# Patient Record
Sex: FEMALE | Race: WHITE | NOT HISPANIC OR LATINO | Employment: FULL TIME | URBAN - METROPOLITAN AREA
[De-identification: names, ages, dates, MRNs, and addresses within clinical notes are randomized per-mention and may not be internally consistent; named-entity substitution may affect disease eponyms.]

---

## 2017-02-07 ENCOUNTER — COMMUNICATION - HEALTHEAST (OUTPATIENT)
Dept: TELEHEALTH | Facility: CLINIC | Age: 37
End: 2017-02-07

## 2017-02-07 ENCOUNTER — OFFICE VISIT - HEALTHEAST (OUTPATIENT)
Dept: FAMILY MEDICINE | Facility: CLINIC | Age: 37
End: 2017-02-07

## 2017-02-07 DIAGNOSIS — Z13.6 SCREENING FOR CARDIOVASCULAR CONDITION: ICD-10-CM

## 2017-02-07 DIAGNOSIS — N76.0 BACTERIAL VAGINOSIS: ICD-10-CM

## 2017-02-07 DIAGNOSIS — B96.89 BACTERIAL VAGINOSIS: ICD-10-CM

## 2017-02-07 DIAGNOSIS — N92.0 MENORRHAGIA WITH REGULAR CYCLE: ICD-10-CM

## 2017-02-07 DIAGNOSIS — Z12.4 SCREENING FOR MALIGNANT NEOPLASM OF CERVIX: ICD-10-CM

## 2017-02-07 DIAGNOSIS — R53.83 FATIGUE, UNSPECIFIED TYPE: ICD-10-CM

## 2017-02-07 DIAGNOSIS — N89.8 VAGINAL DISCHARGE: ICD-10-CM

## 2017-02-07 DIAGNOSIS — E55.9 VITAMIN D DEFICIENCY: ICD-10-CM

## 2017-02-07 DIAGNOSIS — Z00.00 ROUTINE ADULT HEALTH MAINTENANCE: ICD-10-CM

## 2017-02-07 DIAGNOSIS — Z13.1 SCREENING FOR DIABETES MELLITUS: ICD-10-CM

## 2017-02-07 LAB
CHOLEST SERPL-MCNC: 181 MG/DL
FASTING STATUS PATIENT QL REPORTED: YES
HDLC SERPL-MCNC: 74 MG/DL
LDLC SERPL CALC-MCNC: 95 MG/DL
TRIGL SERPL-MCNC: 59 MG/DL

## 2017-02-07 ASSESSMENT — MIFFLIN-ST. JEOR: SCORE: 1350.41

## 2017-02-08 ENCOUNTER — COMMUNICATION - HEALTHEAST (OUTPATIENT)
Dept: FAMILY MEDICINE | Facility: CLINIC | Age: 37
End: 2017-02-08

## 2017-02-10 LAB
HPV INTERPRETATION - HISTORICAL: NORMAL
HPV INTERPRETER - HISTORICAL: NORMAL

## 2017-02-13 ENCOUNTER — COMMUNICATION - HEALTHEAST (OUTPATIENT)
Dept: FAMILY MEDICINE | Facility: CLINIC | Age: 37
End: 2017-02-13

## 2017-02-13 LAB
BKR LAB AP ABNORMAL BLEEDING: NO
BKR LAB AP BIRTH CONTROL/HORMONES: NORMAL
BKR LAB AP CERVICAL APPEARANCE: NORMAL
BKR LAB AP GYN ADEQUACY: NORMAL
BKR LAB AP GYN INTERPRETATION: NORMAL
BKR LAB AP HPV REFLEX: NORMAL
BKR LAB AP LMP: NORMAL
BKR LAB AP PATIENT STATUS: NORMAL
BKR LAB AP PREVIOUS ABNORMAL: NORMAL
BKR LAB AP PREVIOUS NORMAL: 2013
HIGH RISK?: NO
PATH REPORT.COMMENTS IMP SPEC: NORMAL
RESULT FLAG (HE HISTORICAL CONVERSION): NORMAL

## 2018-12-21 ENCOUNTER — OFFICE VISIT - HEALTHEAST (OUTPATIENT)
Dept: FAMILY MEDICINE | Facility: CLINIC | Age: 38
End: 2018-12-21

## 2018-12-21 ENCOUNTER — COMMUNICATION - HEALTHEAST (OUTPATIENT)
Dept: TELEHEALTH | Facility: CLINIC | Age: 38
End: 2018-12-21

## 2018-12-21 DIAGNOSIS — N94.6 DYSMENORRHEA: ICD-10-CM

## 2018-12-21 DIAGNOSIS — N92.0 MENORRHAGIA WITH REGULAR CYCLE: ICD-10-CM

## 2018-12-21 DIAGNOSIS — F41.9 ANXIETY: ICD-10-CM

## 2018-12-21 DIAGNOSIS — F33.1 MODERATE EPISODE OF RECURRENT MAJOR DEPRESSIVE DISORDER (H): ICD-10-CM

## 2018-12-21 ASSESSMENT — MIFFLIN-ST. JEOR: SCORE: 1362.6

## 2019-01-10 ENCOUNTER — RECORDS - HEALTHEAST (OUTPATIENT)
Dept: ADMINISTRATIVE | Facility: OTHER | Age: 39
End: 2019-01-10

## 2020-09-08 ENCOUNTER — OFFICE VISIT - HEALTHEAST (OUTPATIENT)
Dept: FAMILY MEDICINE | Facility: CLINIC | Age: 40
End: 2020-09-08

## 2020-09-08 DIAGNOSIS — R53.83 FATIGUE, UNSPECIFIED TYPE: ICD-10-CM

## 2020-09-08 DIAGNOSIS — Z00.00 ROUTINE ADULT HEALTH MAINTENANCE: ICD-10-CM

## 2020-09-08 DIAGNOSIS — H61.21 IMPACTED CERUMEN OF RIGHT EAR: ICD-10-CM

## 2020-09-08 DIAGNOSIS — F41.9 ANXIETY: ICD-10-CM

## 2020-09-08 DIAGNOSIS — R10.2 PELVIC PAIN IN FEMALE: ICD-10-CM

## 2020-09-08 DIAGNOSIS — N92.0 MENORRHAGIA WITH REGULAR CYCLE: ICD-10-CM

## 2020-09-08 DIAGNOSIS — M54.50 BILATERAL LOW BACK PAIN WITHOUT SCIATICA, UNSPECIFIED CHRONICITY: ICD-10-CM

## 2020-09-08 LAB
ALBUMIN UR-MCNC: NEGATIVE MG/DL
APPEARANCE UR: CLEAR
BILIRUB UR QL STRIP: NEGATIVE
COLOR UR AUTO: YELLOW
ERYTHROCYTE [DISTWIDTH] IN BLOOD BY AUTOMATED COUNT: 13 % (ref 11–14.5)
GLUCOSE UR STRIP-MCNC: NEGATIVE MG/DL
HCT VFR BLD AUTO: 40.6 % (ref 35–47)
HGB BLD-MCNC: 13.6 G/DL (ref 12–16)
HGB UR QL STRIP: ABNORMAL
KETONES UR STRIP-MCNC: NEGATIVE MG/DL
LEUKOCYTE ESTERASE UR QL STRIP: ABNORMAL
MCH RBC QN AUTO: 26.5 PG (ref 27–34)
MCHC RBC AUTO-ENTMCNC: 33.6 G/DL (ref 32–36)
MCV RBC AUTO: 79 FL (ref 80–100)
NITRATE UR QL: NEGATIVE
PH UR STRIP: 5.5 [PH] (ref 5–8)
PLATELET # BLD AUTO: 340 THOU/UL (ref 140–440)
PMV BLD AUTO: 7.7 FL (ref 7–10)
RBC # BLD AUTO: 5.13 MILL/UL (ref 3.8–5.4)
SP GR UR STRIP: 1.01 (ref 1–1.03)
TSH SERPL DL<=0.005 MIU/L-ACNC: 3.35 UIU/ML (ref 0.3–5)
UROBILINOGEN UR STRIP-ACNC: ABNORMAL
WBC: 9.6 THOU/UL (ref 4–11)

## 2020-09-08 ASSESSMENT — MIFFLIN-ST. JEOR: SCORE: 1378.2

## 2020-09-08 ASSESSMENT — PATIENT HEALTH QUESTIONNAIRE - PHQ9: SUM OF ALL RESPONSES TO PHQ QUESTIONS 1-9: 4

## 2020-09-09 LAB — BACTERIA SPEC CULT: NO GROWTH

## 2020-09-11 ENCOUNTER — HOSPITAL ENCOUNTER (OUTPATIENT)
Dept: ULTRASOUND IMAGING | Facility: CLINIC | Age: 40
Discharge: HOME | End: 2020-09-11
Attending: FAMILY MEDICINE

## 2020-09-11 ENCOUNTER — COMMUNICATION - HEALTHEAST (OUTPATIENT)
Dept: TELEHEALTH | Facility: CLINIC | Age: 40
End: 2020-09-11

## 2020-09-11 DIAGNOSIS — M54.50 BILATERAL LOW BACK PAIN WITHOUT SCIATICA, UNSPECIFIED CHRONICITY: ICD-10-CM

## 2020-09-15 ENCOUNTER — COMMUNICATION - HEALTHEAST (OUTPATIENT)
Dept: FAMILY MEDICINE | Facility: CLINIC | Age: 40
End: 2020-09-15

## 2020-09-18 ENCOUNTER — COMMUNICATION - HEALTHEAST (OUTPATIENT)
Dept: FAMILY MEDICINE | Facility: CLINIC | Age: 40
End: 2020-09-18

## 2021-05-25 ENCOUNTER — RECORDS - HEALTHEAST (OUTPATIENT)
Dept: ADMINISTRATIVE | Facility: CLINIC | Age: 41
End: 2021-05-25

## 2021-05-26 ASSESSMENT — PATIENT HEALTH QUESTIONNAIRE - PHQ9: SUM OF ALL RESPONSES TO PHQ QUESTIONS 1-9: 4

## 2021-05-27 ENCOUNTER — RECORDS - HEALTHEAST (OUTPATIENT)
Dept: ADMINISTRATIVE | Facility: CLINIC | Age: 41
End: 2021-05-27

## 2021-05-30 VITALS — HEIGHT: 72 IN | BODY MASS INDEX: 16.97 KG/M2 | WEIGHT: 125.31 LBS

## 2021-06-02 VITALS — WEIGHT: 128 LBS | HEIGHT: 72 IN | BODY MASS INDEX: 17.34 KG/M2

## 2021-06-04 VITALS
WEIGHT: 131.44 LBS | HEIGHT: 72 IN | OXYGEN SATURATION: 100 % | DIASTOLIC BLOOD PRESSURE: 60 MMHG | SYSTOLIC BLOOD PRESSURE: 100 MMHG | BODY MASS INDEX: 17.8 KG/M2 | HEART RATE: 96 BPM

## 2021-06-08 NOTE — PROGRESS NOTES
Assessment & Plan:      1. Routine adult health maintenance      Recommend consider rtc for removal of mole/skin tag on back.    2. Screening for malignant neoplasm of cervix  - Gynecologic Cytology (PAP Smear)  - HPV Cascade (PCR)    3. Fatigue, unspecified type  - Vitamin D, Total (25-Hydroxy)    4. Menorrhagia with regular cycle  - norgestrel-ethinyl estradiol (LO/OVRAL) 0.3-30 mg-mcg per tablet; Take 1 tablet by mouth daily.  Dispense: 84 tablet; Refill: 4    5. Bacterial vaginosis  - metroNIDAZOLE (FLAGYL) 500 MG tablet; Take 1 tablet (500 mg total) by mouth 2 (two) times a day for 7 days. Take with food.  Avoid alcohol while taking.  Dispense: 14 tablet; Refill: 2    6. Vaginal discharge  - Wet Prep, Vaginal    7. Vitamin D deficiency  - ergocalciferol (ERGOCALCIFEROL) 50,000 unit capsule; Take 1 capsule (50,000 Units total) by mouth 2 (two) times a week.  Dispense: 9 capsule; Refill: 5    8. Screening for cardiovascular condition  - Lipid Profile    9. Screening for diabetes mellitus  - Glucose     Patient Counseling:    --Nutrition: Stressed importance of moderation in sodium/caffeine intake, saturated fat and cholesterol, caloric balance, sufficient intake of fresh fruits, vegetables, calcium, and iron.    --Discussed the importance of vitamin D and calcium supplementation/intake, and the risks and benefits of daily use of baby aspirin.   --Family planning:reviewed contraceptive options, supplementing with folate and DHA and review of prescription medications when considering pregnancy.   --Exercise: Stressed the importance of regular weight-bearing exercise    --Substance Abuse: limit alcohol use    --Injury prevention: Discussed safety belts, distracted driving, fire safety    --Dental health: Discussed importance of regular tooth brushing, flossing, and dental visits.    --Discussed pap frequency and indications for stopping screening.   --Discussed risks and benefits of regular breast self exams and  evaluation with any changes    --Immunizations reviewed     Discussed the patient's BMI with her.  The BMI is slightly below average.       Subjective:          Mis Teresa is a 36 y.o. female who presents for annual exam.   The patient reports concerns as noted below.  Heavier menstrual cycles     Fasting today? Yes       Has the patient ever been transfused or tattooed?: no.      Do you have pain that bothers you in your daily life? no       The patient reports that there is not domestic violence in her life.     Gynecologic History     LMP: Patient's last menstrual period was 2017 (approximate).  Cycles regular, normal. Heavier, more crampy, some clots, 2 heavy days, 4 days total.   Some mood issues week before menses   Contraception: none  The patient is sexually active.  Last Pap: 4 years. Results were: normal  Abnormal pap history? yes  : 2  Para: 2002  Boys ages 4 & 6    Healthy Habits:   Regular Exercise: Yes  Sunscreen Use: Yes  Healthy Diet: No  Dental Visits Regularly: Yes  Seat Belt: Yes  Sexually active: Yes  Self Breast Exam Monthly:No  Colonoscopy: No  Lipid Profile: No  Glucose Screen: No  Prevention of Osteoporosis: Yes  Last Dexa: N/A  Guns at Home:  No    Immunization History   Administered Date(s) Administered     DT (pediatric) 1996     Td, historic 2006     Tdap 2006     Immunization status: up to date and documented.   ?Tdap with one of deliveries    The following portions of the patient's history were reviewed and updated as appropriate: allergies, current medications, past family history, past medical history, past social history, past surgical history and problem list.    Review of Systems  A 12 point comprehensive review of systems was negative except as noted.    Energy ok. Restless sleep chronically. Mood ok. No anxiety.   Mole on back - gets caught in clothes, irritated freq. Getting larger.    Objective:        Visit Vitals     /80     Pulse 60      Temp 98.8  F (37.1  C) (Oral)     Ht 6' (1.829 m)     Wt 125 lb 5 oz (56.8 kg)     LMP 01/16/2017 (Approximate)     BMI 17 kg/m2     General: alert, pleasant, and no distress  Head: Normocephalic, without obvious abnormality, atraumatic  Eyes: conjunctivae and sclerae normal and pupils equal, round, reactive to   light and accomodation  Ears: normal TM's and external ear canals both ears  Nose: no discharge, no sinus tenderness  Throat: lips, mucosa, and tongue normal; teeth and gums normal  Neck: no adenopathy, supple, symmetrical, trachea midline and thyroid normal  Back: symmetric, ROM normal. No CVA tenderness.  Lungs: clear to auscultation bilaterally  Breasts: normal appearance, no masses or tenderness  Heart : regular rate and rhythm and no murmurs  Abdomen:  bowel sounds normal, no masses palpable and soft, non-tender  Pelvic: external genitalia normal, mod thick, white vaginal discharge, cervix normal in appearance,   no cervical motion tenderness, uterus normal size and consistency   Extremities: extremities normal, atraumatic, no cyanosis or edema  Pulses: 2+ and symmetric  Skin: Skin color, texture, turgor normal. One 7-8 mm polypoid red-brown lesion on left mid-low back  Lymph nodes: Cervical, supraclavicular, and axillary nodes normal.  Neurologic: Grossly normal           Results for orders placed or performed in visit on 02/07/17   Wet Prep, Vaginal   Result Value Ref Range    Yeast Result No yeast seen No yeast seen    Trichomonas No Trichomonas seen No Trichomonas seen    Clue Cells, Wet Prep Clue cells seen (!) No Clue cells seen   Vitamin D, Total (25-Hydroxy)   Result Value Ref Range    Vitamin D, Total (25-Hydroxy) 18.4 (L) 30.0 - 80.0 ng/mL   Lipid Profile   Result Value Ref Range    Triglycerides 59 <=149 mg/dL    Cholesterol 181 <=199 mg/dL    LDL Calculated 95 <=129 mg/dL    HDL Cholesterol 74 >=50 mg/dL    Patient Fasting > 8hrs? Yes    Glucose   Result Value Ref Range    Glucose 88 70  - 99 mg/dL    Patient Fasting > 8hrs? Yes    Gynecologic Cytology (PAP Smear)   Result Value Ref Range    Case Report       Gynecologic Cytology Report                       Case: [id number]                                   Authorizing Provider:  Clari Fraga MD  Collected:           02/07/2017 1423              Ordering Location:     [facility]       Received:            02/07/2017 1423                                     Family Medicine/OB                                                           First Screen:          Nelly Kong, CT                                                                             (ASCP)                                                                       Specimen:    SUREPATH PAP, SCREENING, Endocervical/cervical                                             Interpretation       Negative for squamous intraepithelial lesion or malignancy    Result Flag Normal Normal    Specimen Adequacy       Satisfactory for evaluation, endocervical/transformation zone component present    HPV Reflex? Yes regardless of result     HIGH RISK No     LMP/Menopause Date 1/16/17     Abnormal Bleeding No     Pt Status n/a     Birth Control/Hormones None     Previous Normal/Date 2013     Prev Abn Date/Dx 2003, s/p ZENIA I on colp     Cervical Appearance Normal    HPV Cascade (PCR)   Result Value Ref Range    Interpretation No HPV Type(s) Detected No HPV Type(s) Detected, No High Risk HPV Type(s) Detected, DNA Quantity Not Sufficient     Rafael Calero MD, [place]

## 2021-06-11 NOTE — PROGRESS NOTES
Assessment & Plan:        1. Routine adult health maintenance  - Influenza, Seasonal Quad, PF =/> 6months    2. Pelvic pain in female       We reviewed potential etiologies for her symptoms and we will check UA/UC and proceed with a pelvic u.s.  - Urinalysis Macroscopic  - Culture, Urine    3. Bilateral low back pain without sciatica, unspecified chronicity  - US Pelvis With Transvaginal Non OB; Future  - Urinalysis Macroscopic  - Culture, Urine    4. Fatigue, unspecified type  - HM2(CBC w/o Differential)  - Thyroid Cascade    5. Menorrhagia with regular cycle    6. Anxiety    7. Impacted cerumen of right ear      Irrigation was performed with partial results. The remainder of the cerumen was removed by myself with an alligator.   - neomycin-polymyxin-hydrocortisone (CORTISPORIN) otic solution; Administer 3 drops to the right ear 3 (three) times a day for 7 days.  Dispense: 10 mL; Refill: 0       Patient Counseling:    --Nutrition: Stressed importance of moderation in sodium/caffeine intake, saturated fat and cholesterol, caloric balance, sufficient intake of fresh fruits, vegetables, calcium, and iron.    --Discussed the importance of vitamin D and calcium supplementation/intake, and the risks and benefits of daily use of baby aspirin.   --Family planning:reviewed contraceptive options, supplementing with folate and DHA and review of prescription medications when considering pregnancy.   --Exercise: Stressed the importance of regular weight-bearing exercise    --Substance Abuse: limit alcohol use    --Injury prevention: Discussed safety belts, distracted driving, fire safety    --Dental health: Discussed importance of regular tooth brushing, flossing, and dental visits.    --Discussed benefits of screening colonoscopy, mammography and the recommended intervals.    --Discussed pap frequency and indications for stopping screening.   --Discussed risks and benefits of regular breast self exams and evaluation with  any changes    --Immunizations reviewed   --Advances Directives were reviewed and she was given a copy of the Honoring Choices Document.       Discussed the patient's BMI with her.  The BMI is in the acceptable range      I have had an Advance Directives discussion with the patient.     Subjective:         Mis Teresa is a 40 y.o. female who presents for annual exam.   The patient reports no concerns.    Intermittent pain in low back for last  radiating around to anterior pelvis - lasts a few days  usu mid-cycle, occ hurts to sit, every other month or so   Cycles regular, heavier than previous, occ clots, so sig cramping  occ bleeding/spotting - usu on underwear, not on wiping  occ pain with intercourse  Intermittent abd pain, sensitive to foods  No change in bowels       Fasting today? No       Has the patient ever been transfused or tattooed?: no.      Do you have pain that bothers you in your daily life? no       The patient reports that there is not domestic violence in her life.     Gynecologic History     LMP: 2020  Contraception: none  The patient is sexually active.   Last Pap: 2017. Results were: normal  Abnormal pap history? yes  Last mammogram: n/a. Results were: n/a  : 2  Para:     Healthy Habits:   Regular Exercise: No  Sunscreen Use: Yes  Healthy Diet: Yes  Dental Visits Regularly: Yes  Seat Belt: Yes  Sexually active: Yes  Self Breast Exam Monthly:Yes  Colonoscopy: No  Lipid Profile: Yes  Glucose Screen: Yes  Prevention of Osteoporosis: No  Last Dexa: No  Guns at Home:  N/A      Immunization History   Administered Date(s) Administered     DT (pediatric) 1996     Td,adult,historic,unspecified 2006     Tdap 2006     Immunization status: up to date and documented.    The following portions of the patient's history were reviewed and updated as appropriate: allergies, current medications, past family history, past medical history, past social history, past  surgical history and problem list.    Review of Systems  A 12 point comprehensive review of systems was negative except as noted.    Some diff falling asleep - unable to get back to sleep  Some fatigue, ongoing  Mood is ok.  Felt poorly on lexapro - diarrhea, abd pain  Decreased hearing on right chronically      Objective:        Vitals:    09/08/20 1347   BP: 100/60   Pulse: 96   SpO2: 100%   Weight: 131 lb 7 oz (59.6 kg)   Height: 6' (1.829 m)   LMP: 08/29/2020      Body mass index is 17.83 kg/m .  General: alert, pleasant, and no distress  Head: Normocephalic, without obvious abnormality, atraumatic  Eyes: conjunctivae and sclerae normal and pupils equal, round, reactive to   light and accomodation  Ears: normal TM and external ear canal left ear, right canal occluded by cerumen.  Nose: no discharge, no sinus tenderness  Throat: lips, mucosa, and tongue normal; teeth and gums normal  Neck: no adenopathy, supple, symmetrical, trachea midline and thyroid normal  Back: symmetric, ROM normal. No CVA tenderness.  Lungs: clear to auscultation bilaterally  Breasts: normal appearance, no masses or tenderness  Heart : regular rate and rhythm and no murmurs  Abdomen:  bowel sounds normal, no masses palpable and soft, non-tender  Pelvic: external genitalia normal,  vagina normal without discharge, cervix normal in appearance,    no cervical motion tenderness, uterus normal size and consistency   Extremities: extremities normal, atraumatic, no cyanosis or edema  Pulses: 2+ and symmetric  Skin: Skin color, texture, turgor normal. No rashes or lesions  Lymph nodes: Cervical, supraclavicular, and axillary nodes normal.  Neurologic: Grossly normal         Results for orders placed or performed in visit on 09/08/20   Culture, Urine    Specimen: Urine   Result Value Ref Range    Culture No Growth    HM2(CBC w/o Differential)   Result Value Ref Range    WBC 9.6 4.0 - 11.0 thou/uL    RBC 5.13 3.80 - 5.40 mill/uL    Hemoglobin 13.6  12.0 - 16.0 g/dL    Hematocrit 40.6 35.0 - 47.0 %    MCV 79 (L) 80 - 100 fL    MCH 26.5 (L) 27.0 - 34.0 pg    MCHC 33.6 32.0 - 36.0 g/dL    RDW 13.0 11.0 - 14.5 %    Platelets 340 140 - 440 thou/uL    MPV 7.7 7.0 - 10.0 fL   Thyroid Cascade   Result Value Ref Range    TSH 3.35 0.30 - 5.00 uIU/mL   Urinalysis Macroscopic   Result Value Ref Range    Color, UA Yellow Colorless, Yellow, Straw, Light Yellow    Clarity, UA Clear Clear    Glucose, UA Negative Negative    Bilirubin, UA Negative Negative    Ketones, UA Negative Negative    Specific Gravity, UA 1.015 1.005 - 1.030    Blood, UA Trace (!) Negative    pH, UA 5.5 5.0 - 8.0    Protein, UA Negative Negative mg/dL    Urobilinogen, UA 0.2 E.U./dL 0.2 E.U./dL, 1.0 E.U./dL    Nitrite, UA Negative Negative    Leukocytes, UA Trace (!) Negative       Us Pelvis With Transvaginal Non Ob Result Date: 9/11/2020  EXAM: US PELVIS WITH TRANSVAGINAL NON OB LOCATION: [facility] DATE/TIME: 9/11/2020 10:24 AM INDICATION: low back and pelvic pain intermittently for several mos COMPARISON: None. TECHNIQUE: Transabdominal scans were performed. Endovaginal ultrasound was performed to better visualize the adnexa. FINDINGS: UTERUS: 9.1 x 5.8 x 4.5 cm. Normal in size and position with no masses. ENDOMETRIUM: 12 mm. Normal smooth endometrium. RIGHT OVARY: 3.1 x 3.2 x 2.1 cm. Normal with flow demonstrated. LEFT OVARY: 2.6 x 2.1 x 1.6 cm. Normal with flow demonstrated. No significant free fluid.     1.  Normal pelvic ultrasound.

## 2021-06-11 NOTE — TELEPHONE ENCOUNTER
----- Message from Clari Fraga MD sent at 9/18/2020 12:17 AM CDT -----  Please CALL pt and notify: normal ultrasound. Call if symptoms are ongoing.

## 2021-06-15 PROBLEM — N92.0 MENORRHAGIA WITH REGULAR CYCLE: Status: ACTIVE | Noted: 2017-02-13

## 2021-06-15 PROBLEM — E55.9 VITAMIN D DEFICIENCY: Status: ACTIVE | Noted: 2017-02-13

## 2021-06-16 PROBLEM — F41.9 ANXIETY: Status: ACTIVE | Noted: 2020-09-08

## 2021-06-20 NOTE — LETTER
Letter by Clari Fraga MD at      Author: Clari Fraga MD Service: -- Author Type: --    Filed:  Encounter Date: 9/15/2020 Status: (Other)         Mis Teresa  [address] [id number]             September 15, 2020         Dear Ms. Teresa,    Below are the results from your recent visit:    Resulted Orders   HM2(CBC w/o Differential)   Result Value Ref Range    WBC 9.6 4.0 - 11.0 thou/uL    RBC 5.13 3.80 - 5.40 mill/uL    Hemoglobin 13.6 12.0 - 16.0 g/dL    Hematocrit 40.6 35.0 - 47.0 %    MCV 79 (L) 80 - 100 fL    MCH 26.5 (L) 27.0 - 34.0 pg    MCHC 33.6 32.0 - 36.0 g/dL    RDW 13.0 11.0 - 14.5 %    Platelets 340 140 - 440 thou/uL    MPV 7.7 7.0 - 10.0 fL   Thyroid Cascade   Result Value Ref Range    TSH 3.35 0.30 - 5.00 uIU/mL   Urinalysis Macroscopic   Result Value Ref Range    Color, UA Yellow Colorless, Yellow, Straw, Light Yellow    Clarity, UA Clear Clear    Glucose, UA Negative Negative    Bilirubin, UA Negative Negative    Ketones, UA Negative Negative    Specific Gravity, UA 1.015 1.005 - 1.030    Blood, UA Trace (!) Negative    pH, UA 5.5 5.0 - 8.0    Protein, UA Negative Negative mg/dL    Urobilinogen, UA 0.2 E.U./dL 0.2 E.U./dL, 1.0 E.U./dL    Nitrite, UA Negative Negative    Leukocytes, UA Trace (!) Negative   Culture, Urine   Result Value Ref Range    Culture No Growth        Normal CBC and thyroid functions. UA/UC are normal/negative for infection.    Please call with questions or contact us using [employer].    Sincerely,    Electronically signed by Clari Fraga MD

## 2021-06-22 NOTE — PROGRESS NOTES
OV   12/21/2018  Assessment & Plan:        1. Anxiety     2. Moderate episode of recurrent major depressive disorder (H)     3. Menorrhagia with regular cycle     4. Dysmenorrhea            We reviewed the likely/potential etiology(ies) for her menorrhagia symptoms and we will refer to OB/Gyn, Dr Isaacs to discuss potential ablation, etc. We reviewed the etiology and natural history for depression/anxiety and options for treatment. We elected to begin escitalopram, and we discussed dose titration. We discussed short-term use of lorazepam as needed and she expressed understanding. We reviewed the potential side effects, need to taper the medications gradually, and indications for urgent evaluation. She will let me know if she has any significant problems or concerns. She should f/u in 1-2 mos for recheck.         Subjective:      Mis Teresa is a 38 y.o. female who presents for evaluation of abnormal periods.  Dysmenorrhea/ Premenstrual Syndrome     Patient complains of menstrual symptoms. Symptoms began 6 months ago. Patient describes symptoms of dyspareunia (mild), menorrhagia (moderate-severe) and menstrual cramping (moderate). Symptoms occur with periods, which are usually 28-30 days apart and quite regular and she also has mid-cycle pain. Patient denies migraine headaches. Evaluation to date includes: none. Treatment to date includes: OTC NSAIDs (not very effective). The patient is sexually active.  She has been off OCPs for a while - not able to tolerate them well.         Mis Teresa is a 38 y.o. female who presents for new evaluation and treatment of depression and anxiety. She has the following symptoms: feelings of losing control, insomnia and racing thoughts. She reports poor motivation and difficulty going anywhere, social anxiety. Onset of symptoms was 1 1/2 months ago. Symptoms have been gradually worsening since that time. Current symptoms also include depressed mood, anhedonia, feelings of  worthlessness/guilt and difficulty concentrating. Patient denies hopelessness and recurrent thoughts of death and panic attacks. She denies current suicidal and homicidal ideation.       Family history significant for anxiety and depression. Risk factors: positive family history in  mother and sister(s), negative life event father  few years ago, holidays difficult and previous episode of depression. Previous treatment includes usure - took medication at age 19 and after pregnancy 5 yrs ago. She complains of the following medication side effects: none.       The following portions of the patient's history were reviewed and updated as appropriate: allergies, current medications and problem list    Review of Systems  12 point ROS negative except as noted above.       Objective:       /82   Pulse 83   Ht 6' (1.829 m)   Wt 128 lb (58.1 kg)   LMP 2018   SpO2 100%   BMI 17.36 kg/m    Physical Exam:  General: Alert, cooperative, no distress  Head: Normocephalic, without obvious abnormality, atraumatic  Eyes: PERRL, conjunctiva/corneas clear, EOM's intact  Throat: Lips, mucosa, and tongue normal; teeth and gums normal  Neck: Supple, symmetrical, trachea midline, no  Back: Symmetric, no CVA tenderness  Lungs: Clear to auscultation bilaterally, respirations unlabored  Heart: Regular rate and rhythm,  no murmur, rub, or gallop,   Abdomen: Soft, non-tender, no masses, no organomegaly  Pelvic:Not examined  Extremities: Extremities normal, atraumatic, no cyanosis or edema  Skin: Skin color, texture, turgor normal, no rashes or lesions  Lymph nodes: Cervical, supraclavicular, and axillary nodes normal  Neurologic: Normal

## 2022-11-08 ENCOUNTER — HOSPITAL ENCOUNTER (OUTPATIENT)
Dept: MAMMOGRAPHY | Facility: CLINIC | Age: 42
Discharge: HOME | End: 2022-11-08
Attending: FAMILY MEDICINE | Admitting: FAMILY MEDICINE
Payer: COMMERCIAL

## 2022-11-08 DIAGNOSIS — Z12.31 VISIT FOR SCREENING MAMMOGRAM: ICD-10-CM

## 2022-11-08 PROCEDURE — 77067 SCR MAMMO BI INCL CAD: CPT

## 2022-11-17 ENCOUNTER — HOSPITAL ENCOUNTER (OUTPATIENT)
Dept: MAMMOGRAPHY | Facility: CLINIC | Age: 42
Discharge: HOME | End: 2022-11-17
Attending: FAMILY MEDICINE
Payer: COMMERCIAL

## 2022-11-17 ENCOUNTER — TRANSFERRED RECORDS (OUTPATIENT)
Dept: HEALTH INFORMATION MANAGEMENT | Facility: CLINIC | Age: 42
End: 2022-11-17

## 2022-11-17 DIAGNOSIS — N64.89 BREAST ASYMMETRY: ICD-10-CM

## 2022-11-17 PROCEDURE — 77061 BREAST TOMOSYNTHESIS UNI: CPT | Mod: RT

## 2022-11-17 PROCEDURE — 76642 ULTRASOUND BREAST LIMITED: CPT | Mod: RT

## 2025-02-09 ENCOUNTER — HEALTH MAINTENANCE LETTER (OUTPATIENT)
Age: 45
End: 2025-02-09

## 2025-02-10 ENCOUNTER — HOSPITAL ENCOUNTER (OUTPATIENT)
Dept: MAMMOGRAPHY | Facility: CLINIC | Age: 45
Discharge: HOME | End: 2025-02-10
Payer: COMMERCIAL

## 2025-02-10 DIAGNOSIS — N64.4 BREAST PAIN, RIGHT: ICD-10-CM

## 2025-02-10 DIAGNOSIS — N63.10 MASS OF RIGHT BREAST, UNSPECIFIED QUADRANT: ICD-10-CM

## 2025-02-10 PROCEDURE — 76642 ULTRASOUND BREAST LIMITED: CPT | Mod: RT

## 2025-02-10 PROCEDURE — 77066 DX MAMMO INCL CAD BI: CPT
